# Patient Record
Sex: MALE | Employment: UNEMPLOYED | URBAN - METROPOLITAN AREA
[De-identification: names, ages, dates, MRNs, and addresses within clinical notes are randomized per-mention and may not be internally consistent; named-entity substitution may affect disease eponyms.]

---

## 2020-01-06 ENCOUNTER — OFFICE VISIT (OUTPATIENT)
Dept: PRIMARY CARE CLINIC | Facility: CLINIC | Age: 85
End: 2020-01-06
Payer: MEDICARE

## 2020-01-06 VITALS
TEMPERATURE: 99 F | SYSTOLIC BLOOD PRESSURE: 126 MMHG | HEART RATE: 69 BPM | WEIGHT: 212.5 LBS | DIASTOLIC BLOOD PRESSURE: 80 MMHG | OXYGEN SATURATION: 94 % | HEIGHT: 65 IN | BODY MASS INDEX: 35.4 KG/M2

## 2020-01-06 DIAGNOSIS — C67.9 MALIGNANT NEOPLASM OF URINARY BLADDER, UNSPECIFIED SITE: ICD-10-CM

## 2020-01-06 DIAGNOSIS — E78.2 HYPERLIPIDEMIA, MIXED: ICD-10-CM

## 2020-01-06 DIAGNOSIS — I10 ESSENTIAL HYPERTENSION: ICD-10-CM

## 2020-01-06 DIAGNOSIS — I48.21 PERMANENT ATRIAL FIBRILLATION: ICD-10-CM

## 2020-01-06 DIAGNOSIS — J96.11 CHRONIC RESPIRATORY FAILURE WITH HYPOXIA AND HYPERCAPNIA: ICD-10-CM

## 2020-01-06 DIAGNOSIS — J44.9 CHRONIC OBSTRUCTIVE PULMONARY DISEASE, UNSPECIFIED COPD TYPE: ICD-10-CM

## 2020-01-06 DIAGNOSIS — B35.1 TINEA UNGUIUM: Primary | ICD-10-CM

## 2020-01-06 DIAGNOSIS — E03.9 HYPOTHYROIDISM (ACQUIRED): ICD-10-CM

## 2020-01-06 DIAGNOSIS — J96.12 CHRONIC RESPIRATORY FAILURE WITH HYPOXIA AND HYPERCAPNIA: ICD-10-CM

## 2020-01-06 DIAGNOSIS — N40.0 BENIGN PROSTATIC HYPERPLASIA WITHOUT LOWER URINARY TRACT SYMPTOMS: ICD-10-CM

## 2020-01-06 PROCEDURE — 3079F DIAST BP 80-89 MM HG: CPT | Mod: HCNC,CPTII,S$GLB, | Performed by: INTERNAL MEDICINE

## 2020-01-06 PROCEDURE — 99999 PR PBB SHADOW E&M-NEW PATIENT-LVL III: ICD-10-PCS | Mod: PBBFAC,HCNC,, | Performed by: INTERNAL MEDICINE

## 2020-01-06 PROCEDURE — 99214 OFFICE O/P EST MOD 30 MIN: CPT | Mod: HCNC,S$GLB,, | Performed by: INTERNAL MEDICINE

## 2020-01-06 PROCEDURE — 3074F PR MOST RECENT SYSTOLIC BLOOD PRESSURE < 130 MM HG: ICD-10-PCS | Mod: HCNC,CPTII,S$GLB, | Performed by: INTERNAL MEDICINE

## 2020-01-06 PROCEDURE — 1159F MED LIST DOCD IN RCRD: CPT | Mod: HCNC,S$GLB,, | Performed by: INTERNAL MEDICINE

## 2020-01-06 PROCEDURE — 3079F PR MOST RECENT DIASTOLIC BLOOD PRESSURE 80-89 MM HG: ICD-10-PCS | Mod: HCNC,CPTII,S$GLB, | Performed by: INTERNAL MEDICINE

## 2020-01-06 PROCEDURE — 99499 RISK ADDL DX/OHS AUDIT: ICD-10-PCS | Mod: S$GLB,,, | Performed by: INTERNAL MEDICINE

## 2020-01-06 PROCEDURE — 3074F SYST BP LT 130 MM HG: CPT | Mod: HCNC,CPTII,S$GLB, | Performed by: INTERNAL MEDICINE

## 2020-01-06 PROCEDURE — 1101F PR PT FALLS ASSESS DOC 0-1 FALLS W/OUT INJ PAST YR: ICD-10-PCS | Mod: HCNC,CPTII,S$GLB, | Performed by: INTERNAL MEDICINE

## 2020-01-06 PROCEDURE — 99499 UNLISTED E&M SERVICE: CPT | Mod: S$GLB,,, | Performed by: INTERNAL MEDICINE

## 2020-01-06 PROCEDURE — 1126F PR PAIN SEVERITY QUANTIFIED, NO PAIN PRESENT: ICD-10-PCS | Mod: HCNC,S$GLB,, | Performed by: INTERNAL MEDICINE

## 2020-01-06 PROCEDURE — 99214 PR OFFICE/OUTPT VISIT, EST, LEVL IV, 30-39 MIN: ICD-10-PCS | Mod: HCNC,S$GLB,, | Performed by: INTERNAL MEDICINE

## 2020-01-06 PROCEDURE — 1101F PT FALLS ASSESS-DOCD LE1/YR: CPT | Mod: HCNC,CPTII,S$GLB, | Performed by: INTERNAL MEDICINE

## 2020-01-06 PROCEDURE — 1159F PR MEDICATION LIST DOCUMENTED IN MEDICAL RECORD: ICD-10-PCS | Mod: HCNC,S$GLB,, | Performed by: INTERNAL MEDICINE

## 2020-01-06 PROCEDURE — 1126F AMNT PAIN NOTED NONE PRSNT: CPT | Mod: HCNC,S$GLB,, | Performed by: INTERNAL MEDICINE

## 2020-01-06 PROCEDURE — 99999 PR PBB SHADOW E&M-NEW PATIENT-LVL III: CPT | Mod: PBBFAC,HCNC,, | Performed by: INTERNAL MEDICINE

## 2020-01-06 RX ORDER — PREDNISONE 5 MG/1
5 TABLET ORAL DAILY
COMMUNITY
Start: 2020-01-02

## 2020-01-06 RX ORDER — METOPROLOL SUCCINATE 100 MG/1
TABLET, EXTENDED RELEASE ORAL
COMMUNITY
Start: 2019-12-19

## 2020-01-06 RX ORDER — LATANOPROST 50 UG/ML
1 SOLUTION/ DROPS OPHTHALMIC DAILY
COMMUNITY
Start: 2019-04-11

## 2020-01-06 RX ORDER — FUROSEMIDE 20 MG/1
1 TABLET ORAL DAILY
COMMUNITY
Start: 2019-12-02

## 2020-01-06 RX ORDER — TAMSULOSIN HYDROCHLORIDE 0.4 MG/1
0.4 CAPSULE ORAL 2 TIMES DAILY
COMMUNITY
Start: 2019-04-11

## 2020-01-06 RX ORDER — LEVOTHYROXINE SODIUM 25 UG/1
25 TABLET ORAL DAILY
COMMUNITY
Start: 2019-04-11 | End: 2020-02-13 | Stop reason: SDUPTHER

## 2020-01-06 RX ORDER — ATORVASTATIN CALCIUM 20 MG/1
20 TABLET, FILM COATED ORAL NIGHTLY
COMMUNITY
Start: 2019-04-11 | End: 2020-03-06 | Stop reason: SDUPTHER

## 2020-01-06 RX ORDER — DILTIAZEM HYDROCHLORIDE 180 MG/1
1 CAPSULE, COATED, EXTENDED RELEASE ORAL DAILY
COMMUNITY
Start: 2019-12-12

## 2020-01-06 RX ORDER — DABIGATRAN ETEXILATE 150 MG/1
150 CAPSULE ORAL 2 TIMES DAILY
COMMUNITY
Start: 2019-04-11

## 2020-01-06 RX ORDER — FINASTERIDE 5 MG/1
5 TABLET, FILM COATED ORAL DAILY
COMMUNITY
Start: 2019-04-11

## 2020-01-06 RX ORDER — AMOXICILLIN 500 MG
1 CAPSULE ORAL DAILY
COMMUNITY

## 2020-01-06 RX ORDER — CICLOPIROX 80 MG/ML
SOLUTION TOPICAL NIGHTLY
Qty: 6.6 ML | Refills: 5 | Status: SHIPPED | OUTPATIENT
Start: 2020-01-06 | End: 2020-03-06 | Stop reason: SDUPTHER

## 2020-01-06 NOTE — PROGRESS NOTES
Patient, Rob Archibald Sr. (MRN #[id number]), presented with a recorded BMI of 35.37 kg/m^2 and a documented comorbidity(s):  - Hypertension  - Hyperlipidemia  - Atrial Fibrillation  to which the severe obesity is a contributing factor. This is consistent with the definition of severe obesity (BMI 35.0-39.9) with comorbidity (ICD-10 E66.01, Z68.35). The patient's severe obesity was monitored, evaluated, addressed and/or treated. This addendum to the medical record is made on 01/06/2020.

## 2020-01-06 NOTE — PROGRESS NOTES
[facility] Note    Chief Complaint      Chief Complaint   Patient presents with    Follow-up     former  patient       History of Present Illness      Rob Archibald Sr. is a 84 y.o. male with chronic conditions of bladder cancer, chronic respiratory failure on continuous home O2, COPD, A fib, HTN, HLD, hypothyroidism, BPH who presents today for: re-establish care from  and review chronic conditions.  Complains of fingernail fungus.    Chronic respiratory failure on continuous home O2, COPD: Sees Dr. Hi.  ON continuous home O2 2-2.5 L.  On prednisone daily, stiolto.    A fib: Controlled on pradaxa, metoprolol, diltiazem.  Sees Dr. Crawford.  Denies chest pain, worsened SOB from baseline lung function, palpitations.    HTN: BP at [place] on metoprolol, diltiazem.  HLD: Controlled on lipitor.    Hypothyroidism: Controlled on synthroid.  TSH due.    BPH: Controlled on flomax.  Sees Dr. Chávez.  Bladder cancer: Sees Dr. Chávez.  Getting BCG treatments.    Flu shot UTD.  Prevnar, pneumovax UTD.    Cscope 5 yrs ago, will get  record.    Past Medical History:  History reviewed. No pertinent past medical history.    Past Surgical History:   has no past surgical history on file.    Family History:  family history is not on file.     Social History:  Social History     Tobacco Use    Smoking status: Former Smoker   Substance Use Topics    Alcohol use: Not on file    Drug use: Not on file       Review of Systems   Constitutional: Negative for chills, fever and malaise/fatigue.   Respiratory: Negative for shortness of breath.    Cardiovascular: Negative for chest pain.   Gastrointestinal: Negative for constipation, diarrhea, nausea and vomiting.   Skin: Negative for rash.   Neurological: Negative for weakness.        Medications:  Outpatient Encounter Medications as of 1/6/2020   Medication Sig Dispense Refill    atorvastatin (LIPITOR) 20 MG tablet Take 20 mg by mouth every evening.      dabigatran  etexilate (PRADAXA) 150 mg Cap Take 150 mg by mouth 2 (two) times daily.      diltiaZEM (CARDIZEM CD) 180 MG 24 hr capsule Take 1 capsule by mouth once daily.      finasteride (PROSCAR) 5 mg tablet Take 5 mg by mouth once daily.      folic acid/multivit-min/lutein (CENTRUM SILVER ORAL) Take 1 tablet by mouth once daily.      furosemide (LASIX) 20 MG tablet Take 1 tablet by mouth once daily.      latanoprost 0.005 % ophthalmic solution 1 drop once daily.      levothyroxine (SYNTHROID) 25 MCG tablet Take 25 mcg by mouth once daily.      metoprolol succinate (TOPROL-XL) 100 MG 24 hr tablet 1 1/2 po bid      omega-3 fatty acids/fish oil (FISH OIL-OMEGA-3 FATTY ACIDS) 300-1,000 mg capsule Take 1 capsule by mouth once daily.      predniSONE (DELTASONE) 5 MG tablet Take 5 mg by mouth once daily.      tamsulosin (FLOMAX) 0.4 mg Cap Take 0.4 mg by mouth 2 (two) times daily.      tiotropium-olodaterol (STIOLTO RESPIMAT) 2.5-2.5 mcg/actuation Mist 2 puffs. 2 puffs once daily      vitC/E/Zn/copper/lutein/zeaxan (ICAPS AREDS2 ORAL) Take 1 tablet by mouth 2 (two) times daily.      ciclopirox (PENLAC) 8 % Soln Apply topically nightly. 6.6 mL 5     No facility-administered encounter medications on file as of 1/6/2020.        Allergies:  Review of patient's allergies indicates:   Allergen Reactions    Amiodarone analogues      Uncontrolled tremors       Health Maintenance:  Immunization History   Administered Date(s) Administered    Influenza 11/01/2012, 10/22/2013, 10/08/2014, 10/04/2017    Pneumococcal Conjugate - 13 Valent 12/09/2014    Pneumococcal Polysaccharide - 23 Valent 09/03/2010, 03/15/2019      Health Maintenance   Topic Date Due    Lipid Panel  1935    TETANUS VACCINE  07[birth year]02/1953    Pneumococcal Vaccine (65+ Low/Medium Risk) (1 of 2 - PCV13) 07/02/2000        Physical Exam      Vital Signs  Temp: 99.2 °F (37.3 °C)  Temp src: Oral  Pulse: 69  SpO2: (!) 94 %  BP: 126/80  BP Location: Right  "arm  Patient Position: Sitting  Pain Score: 0-No pain  Height and Weight  Height: 5' 5" (165.1 cm)  Weight: 96.4 kg (212 lb 8.4 oz)  BSA (Calculated - sq m): 2.1 sq meters  BMI (Calculated): 35.4  Weight in (lb) to have BMI = 25: 149.9]    Physical Exam   Constitutional: He appears well-developed and well-nourished.   HENT:   Head: Normocephalic and atraumatic.   Right Ear: External ear normal.   Left Ear: External ear normal.   Mouth/Throat: Oropharynx is clear and moist.   Eyes: Pupils are equal, round, and reactive to light. Conjunctivae and EOM are normal.   Cardiovascular: Normal rate, normal heart sounds and intact distal pulses.   No murmur heard.  AICD in place left upper chest.  Irregular rhythm   Pulmonary/Chest: Effort normal and breath sounds normal. He has no wheezes. He has no rales.   On home O2 via [place]   Abdominal: Soft. Bowel sounds are normal. He exhibits no distension and no abdominal bruit. There is no hepatosplenomegaly. There is no tenderness.   Skin:   Tinea unguium all nails on left hand   Vitals reviewed.       Laboratory:  CBC:      CMP:        Invalid input(s): CREATININ  URINALYSIS:       LIPIDS:      TSH:      A1C:        Assessment/Plan     Rob Archibald Sr. is a 84 y.o.male with:    1. Chronic respiratory failure with hypoxia and hypercapnia  2. Chronic obstructive pulmonary disease, unspecified COPD type  Continue current meds.  F/u with Dr. Hi  3. Essential hypertension  - CBC auto differential; Future  - Comprehensive metabolic panel; Future  - Lipid panel; Future  - CBC auto differential  - Comprehensive metabolic panel  - Lipid panel  Continue current meds.    4. Hyperlipidemia, mixed  - CBC auto differential; Future  - Comprehensive metabolic panel; Future  - Lipid panel; Future  - CBC auto differential  - Comprehensive metabolic panel  - Lipid panel  Continue current meds.  Update labs  5. Permanent atrial fibrillation  Continue current meds.  F/U with Dr. Crawford  6. Malignant " neoplasm of urinary bladder, unspecified site  - CBC auto differential; Future  - Comprehensive metabolic panel; Future  - Lipid panel; Future  - TSH; Future  - T4, free; Future  - CBC auto differential  - Comprehensive metabolic panel  - Lipid panel  - TSH  - T4, free  Continue current meds.  F/u with Dr. Chávez.  7. Hypothyroidism (acquired)  - TSH; Future  - T4, free; Future  - TSH  - T4, free  Continue current meds.    8. Benign prostatic hyperplasia without lower urinary tract symptoms  Continue current meds.    9. Tinea unguium  - ciclopirox (PENLAC) 8 % Soln; Apply topically nightly.  Dispense: 6.6 mL; Refill: 5  Try penlac.  If not effective, will discuss with DR. Crawford regarding lamisil.      Chronic conditions status updated as per HPI.  Other than changes above, cont current medications and maintain follow up with specialists.  Return to clinic in 6 months.    Wm Devries MD  [facility]

## 2020-01-10 LAB
ALBUMIN: 4.4 GRAM/DL (ref 3.5–5)
ALP SERPL-CCNC: 68 UNIT/L (ref 38–126)
ALT SERPL W P-5'-P-CCNC: 21 UNIT/L (ref 7–56)
ANION GAP SERPL CALC-SCNC: 18 MEQ/L (ref 9–18)
AST SERPL-CCNC: 22 UNIT/L (ref 7–40)
BASOPHILS ABSOLUTE COUNT: 0 K/UL (ref 0–0.2)
BASOPHILS NFR BLD: 0.3 % (ref 0–2)
BILIRUB SERPL-MCNC: 1.2 MG/DL (ref 0–1.2)
BUN BLD-MCNC: 14 MG/DL (ref 7–21)
BUN/CREAT SERPL: 14 RATIO (ref 6–22)
CALC OSMOLALITY: 290 MOSM/KG (ref 275–295)
CALCIUM SERPL-MCNC: 9.2 MG/DL (ref 8.5–10.3)
CHLORIDE SERPL-SCNC: 99 MEQ/L (ref 98–107)
CHOL/HDLC RATIO: 2
CHOLEST SERPL-MSCNC: 127 MG/DL (ref 100–200)
CO2 SERPL-SCNC: 32 MEQ/L (ref 21–31)
CREAT SERPL-MCNC: 1 MG/DL (ref 0.7–1.2)
DIFFERENTIAL TYPE: ABNORMAL
EOSINOPHIL NFR BLD: 0.6 % (ref 0–4)
EOSINOPHILS ABSOLUTE COUNT: 0 K/UL (ref 0–0.7)
ERYTHROCYTE [DISTWIDTH] IN BLOOD BY AUTOMATED COUNT: 13.5 GRAM/DL (ref 12–15.3)
FREE T4: 1.3 NANOGRAM/DL (ref 0.9–1.7)
GFR: 74.6 ML/MIN/1.73M2
GLUCOSE SERPL-MCNC: 112 MG/DL (ref 70–100)
HCT VFR BLD AUTO: 42.8 % (ref 40–52)
HDLC SERPL-MCNC: 62 MG/DL (ref 40–75)
HGB BLD-MCNC: 14.8 GRAM/DL (ref 13.6–17.5)
LDLC SERPL CALC-MCNC: 43 MG/DL (ref 0–130)
LYMPHOCYTES %: 33.4 % (ref 15–45)
LYMPHOCYTES ABSOLUTE COUNT: 1.9 K/UL (ref 1–4.2)
MCH RBC QN AUTO: 37.1 PICOGRAM (ref 27–33)
MCHC RBC AUTO-ENTMCNC: 34.7 GRAM/DL (ref 32–36)
MCV RBC AUTO: 106.9 FEMTOLITER (ref 80–94)
MONOCYTES %: 13.3 % (ref 3–13)
MONOCYTES ABSOLUTE COUNT: 0.7 K/UL (ref 0.1–0.8)
NEUTROPHILS ABSOLUTE COUNT: 3 K/UL (ref 2.1–7.6)
NEUTROPHILS RELATIVE PERCENT: 52.4 % (ref 32–80)
NONHDLC SERPL-MCNC: 65 MG/DL (ref 60–125)
PLATELET # BLD AUTO: 177 K/UL (ref 150–350)
PMV BLD AUTO: 8.8 FEMTOLITER (ref 7–10.2)
POTASSIUM SERPL-SCNC: 4.2 MEQ/L (ref 3.5–5)
RBC # BLD AUTO: 4 MIL/UL (ref 4.45–5.9)
SODIUM BLD-SCNC: 145 MEQ/L (ref 135–145)
TOTAL PROTEIN: 6.1 GRAM/DL (ref 6.3–8.2)
TRIGL SERPL-MCNC: 140 MG/DL (ref 30–150)
TSH SERPL DL<=0.005 MIU/L-ACNC: 2.36 UIL/ML (ref 0.35–4)
WBC # BLD AUTO: 5.6 K/UL (ref 4.5–11)

## 2020-02-13 RX ORDER — LEVOTHYROXINE SODIUM 25 UG/1
25 TABLET ORAL DAILY
Qty: 90 TABLET | Refills: 3 | Status: SHIPPED | OUTPATIENT
Start: 2020-02-13

## 2020-02-13 NOTE — TELEPHONE ENCOUNTER
----- Message from Desiree Jimenez sent at 2/13/2020  9:51 AM CST -----  Contact: pt  Type:  RX Refill Request    Who Called: Patient  Refill or New Rx: Refill  RX Name and Strength: levothyroxine (SYNTHROID) 25 MCG tablet  How is the patient currently taking it? (ex. 1XDay): 25mcg PO once/day   Is this a 30 day or 90 day RX: 90day  Preferred Pharmacy with phone number: [facility] - [facility] [address] [phone]  Local or Mail Order:   Ordering Provider:   Would the patient rather a call back or a response via [facility]? Call Back  Best Call Back Number:[phone]  Additional Information: Last completed NP appt with this provider was on 1/6/2020. Next Appt is scheduled for 7/3/2020

## 2020-03-06 DIAGNOSIS — B35.1 TINEA UNGUIUM: ICD-10-CM

## 2020-03-06 RX ORDER — CICLOPIROX 80 MG/ML
SOLUTION TOPICAL NIGHTLY
Qty: 6.6 ML | Refills: 5 | Status: SHIPPED | OUTPATIENT
Start: 2020-03-06

## 2020-03-06 RX ORDER — ATORVASTATIN CALCIUM 20 MG/1
20 TABLET, FILM COATED ORAL NIGHTLY
Qty: 90 TABLET | Refills: 3 | Status: SHIPPED | OUTPATIENT
Start: 2020-03-06

## 2020-03-06 NOTE — TELEPHONE ENCOUNTER
----- Message from Morgan Alanis sent at 3/6/2020  1:25 PM CST -----  Contact: Pt [phone]  Is this a refill or new RX:  Refill    RX name and strength: atorvastatin (LIPITOR) 20 MG tablet and ciclopirox (PENLAC) 8 % Soln      Is this a 30 day or 90 day RX:  90     Pharmacy name and phone # [facility] - [place], [place] - [address] [phone] (Phone) [phone] (Fax)

## 2020-06-01 ENCOUNTER — TELEPHONE (OUTPATIENT)
Dept: FAMILY MEDICINE | Facility: CLINIC | Age: 85
End: 2020-06-01

## 2020-06-01 NOTE — TELEPHONE ENCOUNTER
----- Message from Estelle Goldberg sent at 6/1/2020 12:45 PM CDT -----  Contact: [facility] [place] Office/ [place] [phone]  [place] HOFFMANN/ [facility]  Office regarding the patient death 05-27-20.    They would like to know if the doctor would sign the death certificate.    Please advise, thank you.